# Patient Record
Sex: MALE | Race: WHITE | NOT HISPANIC OR LATINO | Employment: OTHER | URBAN - METROPOLITAN AREA
[De-identification: names, ages, dates, MRNs, and addresses within clinical notes are randomized per-mention and may not be internally consistent; named-entity substitution may affect disease eponyms.]

---

## 2017-12-13 PROBLEM — 59621000 ESSENTIAL HYPERTENSION: Status: ACTIVE | Noted: 2017-12-13

## 2017-12-13 PROBLEM — 313436004 TYPE II DIABETES MELLITUS WITHOUT COMPLICATION: Status: ACTIVE | Noted: 2017-12-13

## 2017-12-13 PROBLEM — 238131007 OVERWEIGHT: Status: ACTIVE | Noted: 2017-12-13

## 2020-06-29 ENCOUNTER — OFFICE VISIT (OUTPATIENT)
Dept: URBAN - METROPOLITAN AREA CLINIC 27 | Facility: CLINIC | Age: 65
End: 2020-06-29

## 2020-07-16 ENCOUNTER — OFFICE VISIT (OUTPATIENT)
Dept: URBAN - METROPOLITAN AREA SURGERY CENTER 7 | Facility: SURGERY CENTER | Age: 65
End: 2020-07-16

## 2021-06-01 ENCOUNTER — OFFICE VISIT (OUTPATIENT)
Dept: URBAN - METROPOLITAN AREA SURGERY CENTER 7 | Facility: SURGERY CENTER | Age: 66
End: 2021-06-01

## 2022-04-15 ENCOUNTER — OFFICE VISIT (OUTPATIENT)
Dept: URBAN - METROPOLITAN AREA CLINIC 27 | Facility: CLINIC | Age: 67
End: 2022-04-15

## 2022-04-19 ENCOUNTER — OFFICE VISIT (OUTPATIENT)
Dept: URBAN - METROPOLITAN AREA SURGERY CENTER 7 | Facility: SURGERY CENTER | Age: 67
End: 2022-04-19

## 2022-04-30 ENCOUNTER — TELEPHONE ENCOUNTER (OUTPATIENT)
Dept: URBAN - METROPOLITAN AREA CLINIC 121 | Facility: CLINIC | Age: 67
End: 2022-04-30

## 2022-04-30 RX ORDER — MUPIROCIN 20 MG/G
OINTMENT TOPICAL
OUTPATIENT
Start: 2017-12-13

## 2022-04-30 RX ORDER — CANAGLIFLOZIN 100 MG/1
TABLET, FILM COATED ORAL
OUTPATIENT
Start: 2017-12-13

## 2022-04-30 RX ORDER — ASPIRIN 325 MG/1
TABLET ORAL
OUTPATIENT
Start: 2017-12-21

## 2022-04-30 RX ORDER — FAMOTIDINE 10 MG
TABLET ORAL
OUTPATIENT
Start: 2017-12-13

## 2022-04-30 RX ORDER — LISDEXAMFETAMINE DIMESYLATE 70 MG/1
CAPSULE ORAL
OUTPATIENT
Start: 2017-12-13

## 2022-04-30 RX ORDER — LISINOPRIL 5 MG/1
TABLET ORAL
OUTPATIENT
Start: 2017-12-13

## 2022-04-30 RX ORDER — BUPROPION HYDROCHLORIDE 200 MG/1
TABLET, FILM COATED ORAL
OUTPATIENT
Start: 2017-12-13

## 2022-04-30 RX ORDER — EMPAGLIFLOZIN 10 MG/1
TABLET, FILM COATED ORAL
OUTPATIENT
Start: 2017-12-13 | End: 2022-04-15

## 2022-04-30 RX ORDER — LISDEXAMFETAMINE DIMESYLATE 70 MG/1
CAPSULE ORAL
OUTPATIENT
Start: 2017-12-13 | End: 2022-04-15

## 2022-04-30 RX ORDER — INSULIN DETEMIR 100 [IU]/ML
INJECTION, SOLUTION SUBCUTANEOUS
OUTPATIENT
Start: 2017-12-13 | End: 2022-04-15

## 2022-04-30 RX ORDER — ACYCLOVIR 200 MG/1
CAPSULE ORAL
OUTPATIENT
Start: 2017-12-13

## 2022-04-30 RX ORDER — TIZANIDINE HYDROCHLORIDE 4 MG/1
CAPSULE ORAL
OUTPATIENT
Start: 2017-12-13

## 2022-04-30 RX ORDER — OMEPRAZOLE 40 MG/1
1 CAPSULE PO QAM CAPSULE, DELAYED RELEASE ORAL
OUTPATIENT
Start: 2017-12-21

## 2022-04-30 RX ORDER — ARNICA MONTANA 30 [HP_C]/1
PELLET ORAL
OUTPATIENT
Start: 2017-12-13

## 2022-04-30 RX ORDER — HYDROCODONE BITARTRATE AND ACETAMINOPHEN 10; 325 MG/1; MG/1
TABLET ORAL
OUTPATIENT
Start: 2017-12-13

## 2022-04-30 RX ORDER — PRAVASTATIN SODIUM 40 MG/1
TABLET ORAL
OUTPATIENT
Start: 2017-12-13

## 2022-04-30 RX ORDER — GLIPIZIDE 10 MG/1
TABLET ORAL
OUTPATIENT
Start: 2017-12-13

## 2022-04-30 RX ORDER — RABEPRAZOLE SODIUM 20 MG/1
TABLET, DELAYED RELEASE ORAL
OUTPATIENT
Start: 2017-12-13

## 2022-04-30 RX ORDER — HYDROCODONE BITARTRATE AND ACETAMINOPHEN 10; 325 MG/1; MG/1
TABLET ORAL
OUTPATIENT
Start: 2017-12-13 | End: 2022-04-15

## 2022-04-30 RX ORDER — CANAGLIFLOZIN 100 MG/1
TABLET, FILM COATED ORAL
OUTPATIENT
Start: 2017-12-13 | End: 2022-04-15

## 2022-04-30 RX ORDER — LORATADINE ORAL 5 MG/5ML
SOLUTION ORAL
OUTPATIENT
Start: 2017-12-13

## 2022-04-30 RX ORDER — INSULIN DETEMIR 100 [IU]/ML
INJECTION, SOLUTION SUBCUTANEOUS
OUTPATIENT
Start: 2017-12-13

## 2022-04-30 RX ORDER — FAMOTIDINE 10 MG
TABLET ORAL
OUTPATIENT
Start: 2017-12-13 | End: 2022-04-15

## 2022-04-30 RX ORDER — ARMODAFINIL 250 MG/1
TABLET ORAL
OUTPATIENT
Start: 2017-12-13

## 2022-04-30 RX ORDER — EMPAGLIFLOZIN 10 MG/1
TABLET, FILM COATED ORAL
OUTPATIENT
Start: 2017-12-13

## 2022-04-30 RX ORDER — RABEPRAZOLE SODIUM 20 MG/1
TABLET, DELAYED RELEASE ORAL
OUTPATIENT
Start: 2017-12-13 | End: 2022-04-15

## 2022-04-30 RX ORDER — OMEPRAZOLE 40 MG/1
TAKE ONE CAPSULE BY MOUTH EVERY MORNING CAPSULE, DELAYED RELEASE ORAL
OUTPATIENT
Start: 2019-05-28

## 2022-04-30 RX ORDER — OMEPRAZOLE 40 MG/1
TAKE 1 CAPSULE BY MOUTH EVERY MORNING CAPSULE, DELAYED RELEASE ORAL
OUTPATIENT
Start: 2019-05-28 | End: 2022-04-15

## 2022-04-30 RX ORDER — MUPIROCIN 20 MG/G
OINTMENT TOPICAL
OUTPATIENT
Start: 2017-12-13 | End: 2022-04-15

## 2022-04-30 RX ORDER — METFORMIN HYDROCHLORIDE 850 MG/1
TABLET ORAL
OUTPATIENT
Start: 2017-12-13

## 2022-05-01 ENCOUNTER — TELEPHONE ENCOUNTER (OUTPATIENT)
Dept: URBAN - METROPOLITAN AREA CLINIC 121 | Facility: CLINIC | Age: 67
End: 2022-05-01

## 2022-05-01 RX ORDER — GLIPIZIDE 10 MG/1
TABLET ORAL
Status: ACTIVE | COMMUNITY
Start: 2017-12-13

## 2022-05-01 RX ORDER — PRAVASTATIN SODIUM 40 MG/1
TABLET ORAL
Status: ACTIVE | COMMUNITY
Start: 2017-12-13

## 2022-05-01 RX ORDER — TIZANIDINE HYDROCHLORIDE 4 MG/1
CAPSULE ORAL
Status: ACTIVE | COMMUNITY
Start: 2017-12-13

## 2022-05-01 RX ORDER — ARMODAFINIL 250 MG/1
TABLET ORAL
Status: ACTIVE | COMMUNITY
Start: 2017-12-13

## 2022-05-01 RX ORDER — METFORMIN HYDROCHLORIDE 850 MG/1
TABLET ORAL
Status: ACTIVE | COMMUNITY
Start: 2017-12-13

## 2022-05-01 RX ORDER — LORATADINE 5 MG/5ML
SOLUTION ORAL
Status: ACTIVE | COMMUNITY
Start: 2017-12-13

## 2022-05-01 RX ORDER — LISINOPRIL 5 MG/1
TABLET ORAL
Status: ACTIVE | COMMUNITY
Start: 2017-12-13

## 2022-05-01 RX ORDER — ASPIRIN 325 MG/1
TABLET ORAL
Status: ACTIVE | COMMUNITY
Start: 2017-12-21

## 2022-05-01 RX ORDER — BUPROPION HYDROCHLORIDE 200 MG/1
TABLET, FILM COATED ORAL
Status: ACTIVE | COMMUNITY
Start: 2017-12-13

## 2022-05-01 RX ORDER — DICLOFENAC SODIUM 1 %
GEL (GRAM) TOPICAL
Status: ACTIVE | COMMUNITY
Start: 2017-12-13

## 2022-05-01 RX ORDER — PANTOPRAZOLE SODIUM 40 MG/1
TAKE 1 TABLET BY MOUTH ONCE A DAY TABLET, DELAYED RELEASE ORAL
Status: ACTIVE | COMMUNITY
Start: 2022-04-25 | End: 2022-08-23

## 2022-05-06 ENCOUNTER — OFFICE VISIT (OUTPATIENT)
Dept: URBAN - METROPOLITAN AREA SURGERY CENTER 7 | Facility: SURGERY CENTER | Age: 67
End: 2022-05-06

## 2022-06-01 ENCOUNTER — OFFICE VISIT (OUTPATIENT)
Dept: URBAN - METROPOLITAN AREA SURGERY CENTER 7 | Facility: SURGERY CENTER | Age: 67
End: 2022-06-01
Payer: MEDICARE

## 2022-06-01 ENCOUNTER — WEB ENCOUNTER (OUTPATIENT)
Dept: URBAN - METROPOLITAN AREA SURGERY CENTER 7 | Facility: SURGERY CENTER | Age: 67
End: 2022-06-01

## 2022-06-01 DIAGNOSIS — R13.14 CRICOPHARYNGEAL DISORDER: ICD-10-CM

## 2022-06-01 DIAGNOSIS — K22.2 ACQUIRED ESOPHAGEAL RING: ICD-10-CM

## 2022-06-01 PROCEDURE — 43235 EGD DIAGNOSTIC BRUSH WASH: CPT | Performed by: CLINIC/CENTER

## 2022-06-01 PROCEDURE — 43235 EGD DIAGNOSTIC BRUSH WASH: CPT | Performed by: INTERNAL MEDICINE

## 2022-06-01 PROCEDURE — 43450 DILATE ESOPHAGUS 1/MULT PASS: CPT | Performed by: INTERNAL MEDICINE

## 2022-06-01 PROCEDURE — G8075 ESRD PT W/ DIALY OF URR>=65%: HCPCS | Performed by: CLINIC/CENTER

## 2022-06-01 PROCEDURE — G8907 PT DOC NO EVENTS ON DISCHARG: HCPCS | Performed by: INTERNAL MEDICINE

## 2022-06-01 PROCEDURE — 43450 DILATE ESOPHAGUS 1/MULT PASS: CPT | Performed by: CLINIC/CENTER

## 2022-06-01 PROCEDURE — G8075 ESRD PT W/ DIALY OF URR>=65%: HCPCS | Performed by: INTERNAL MEDICINE

## 2022-06-01 PROCEDURE — G8907 PT DOC NO EVENTS ON DISCHARG: HCPCS | Performed by: CLINIC/CENTER

## 2022-06-01 RX ORDER — BUPROPION HYDROCHLORIDE 200 MG/1
TABLET, FILM COATED ORAL
Status: ACTIVE | COMMUNITY
Start: 2017-12-13

## 2022-06-01 RX ORDER — PRAVASTATIN SODIUM 40 MG/1
TABLET ORAL
Status: ACTIVE | COMMUNITY
Start: 2017-12-13

## 2022-06-01 RX ORDER — GLIPIZIDE 10 MG/1
TABLET ORAL
Status: ACTIVE | COMMUNITY
Start: 2017-12-13

## 2022-06-01 RX ORDER — METFORMIN HYDROCHLORIDE 850 MG/1
TABLET ORAL
Status: ACTIVE | COMMUNITY
Start: 2017-12-13

## 2022-06-01 RX ORDER — DICLOFENAC SODIUM 1 %
GEL (GRAM) TOPICAL
Status: ACTIVE | COMMUNITY
Start: 2017-12-13

## 2022-06-01 RX ORDER — LORATADINE 5 MG/5ML
SOLUTION ORAL
Status: ACTIVE | COMMUNITY
Start: 2017-12-13

## 2022-06-01 RX ORDER — ASPIRIN 325 MG/1
TABLET ORAL
Status: ACTIVE | COMMUNITY
Start: 2017-12-21

## 2022-06-01 RX ORDER — ARMODAFINIL 250 MG/1
TABLET ORAL
Status: ACTIVE | COMMUNITY
Start: 2017-12-13

## 2022-06-01 RX ORDER — PANTOPRAZOLE SODIUM 40 MG/1
TAKE 1 TABLET BY MOUTH ONCE A DAY TABLET, DELAYED RELEASE ORAL
Status: ACTIVE | COMMUNITY
Start: 2022-04-25 | End: 2022-08-23

## 2022-06-01 RX ORDER — TIZANIDINE HYDROCHLORIDE 4 MG/1
CAPSULE ORAL
Status: ACTIVE | COMMUNITY
Start: 2017-12-13

## 2022-06-01 RX ORDER — LISINOPRIL 5 MG/1
TABLET ORAL
Status: ACTIVE | COMMUNITY
Start: 2017-12-13

## 2022-08-23 ENCOUNTER — ERX REFILL RESPONSE (OUTPATIENT)
Dept: URBAN - METROPOLITAN AREA CLINIC 27 | Facility: CLINIC | Age: 67
End: 2022-08-23

## 2022-08-23 RX ORDER — PANTOPRAZOLE SODIUM 40 MG/1
TAKE ONE TABLET BY MOUTH DAILY TABLET, DELAYED RELEASE ORAL
Qty: 30 TABLET | Refills: 0 | OUTPATIENT

## 2022-08-23 RX ORDER — PANTOPRAZOLE SODIUM 40 MG/1
TAKE 1 TABLET BY MOUTH ONCE A DAY TABLET, DELAYED RELEASE ORAL
OUTPATIENT

## 2022-09-26 ENCOUNTER — ERX REFILL RESPONSE (OUTPATIENT)
Dept: URBAN - METROPOLITAN AREA CLINIC 27 | Facility: CLINIC | Age: 67
End: 2022-09-26

## 2022-09-26 RX ORDER — PANTOPRAZOLE SODIUM 40 MG/1
TAKE ONE TABLET BY MOUTH DAILY TABLET, DELAYED RELEASE ORAL
Qty: 30 TABLET | Refills: 0 | OUTPATIENT

## 2022-10-14 ENCOUNTER — TELEPHONE ENCOUNTER (OUTPATIENT)
Dept: URBAN - METROPOLITAN AREA CLINIC 29 | Facility: CLINIC | Age: 67
End: 2022-10-14

## 2022-10-14 RX ORDER — PANTOPRAZOLE SODIUM 40 MG/1
TAKE ONE TABLET BY MOUTH DAILY TABLET, DELAYED RELEASE ORAL ONCE A DAY
Qty: 30 | Refills: 3

## 2022-10-16 ENCOUNTER — ERX REFILL RESPONSE (OUTPATIENT)
Dept: URBAN - METROPOLITAN AREA CLINIC 27 | Facility: CLINIC | Age: 67
End: 2022-10-16

## 2022-10-16 RX ORDER — PANTOPRAZOLE SODIUM 40 MG/1
TAKE ONE TABLET BY MOUTH DAILY TABLET, DELAYED RELEASE ORAL
Qty: 30 TABLET | Refills: 0 | OUTPATIENT

## 2022-10-16 RX ORDER — PANTOPRAZOLE SODIUM 40 MG/1
TAKE ONE TABLET BY MOUTH DAILY TABLET, DELAYED RELEASE ORAL ONCE A DAY
Qty: 30 | Refills: 3 | OUTPATIENT

## 2023-03-10 PROBLEM — 40739000 DYSPHAGIA: Status: ACTIVE | Noted: 2017-12-13

## 2023-03-13 ENCOUNTER — ERX REFILL RESPONSE (OUTPATIENT)
Dept: URBAN - METROPOLITAN AREA CLINIC 29 | Facility: CLINIC | Age: 68
End: 2023-03-13

## 2023-03-13 RX ORDER — PANTOPRAZOLE 40 MG/1
TAKE ONE TABLET BY MOUTH DAILY TABLET, DELAYED RELEASE ORAL
Qty: 30 TABLET | Refills: 0 | OUTPATIENT

## 2023-03-13 RX ORDER — PANTOPRAZOLE SODIUM 40 MG/1
TAKE ONE TABLET BY MOUTH DAILY TABLET, DELAYED RELEASE ORAL
Qty: 30 TABLET | Refills: 0 | OUTPATIENT

## 2023-04-03 ENCOUNTER — LAB OUTSIDE AN ENCOUNTER (OUTPATIENT)
Dept: URBAN - METROPOLITAN AREA CLINIC 27 | Facility: CLINIC | Age: 68
End: 2023-04-03

## 2023-04-03 ENCOUNTER — OFFICE VISIT (OUTPATIENT)
Dept: URBAN - METROPOLITAN AREA CLINIC 27 | Facility: CLINIC | Age: 68
End: 2023-04-03
Payer: MEDICARE

## 2023-04-03 ENCOUNTER — DASHBOARD ENCOUNTERS (OUTPATIENT)
Age: 68
End: 2023-04-03

## 2023-04-03 VITALS
DIASTOLIC BLOOD PRESSURE: 54 MMHG | WEIGHT: 160 LBS | BODY MASS INDEX: 26.66 KG/M2 | HEIGHT: 65 IN | SYSTOLIC BLOOD PRESSURE: 116 MMHG | HEART RATE: 69 BPM

## 2023-04-03 DIAGNOSIS — K21.9 GERD WITHOUT ESOPHAGITIS: ICD-10-CM

## 2023-04-03 DIAGNOSIS — Z12.11 ENCOUNTER FOR SCREENING FOR MALIGNANT NEOPLASM OF COLON: ICD-10-CM

## 2023-04-03 DIAGNOSIS — R13.19 ESOPHAGEAL DYSPHAGIA: ICD-10-CM

## 2023-04-03 PROBLEM — 275978004 SCREENING FOR MALIGNANT NEOPLASM OF COLON: Status: ACTIVE | Noted: 2017-12-13

## 2023-04-03 PROBLEM — 266435005: Status: ACTIVE | Noted: 2023-04-03

## 2023-04-03 PROCEDURE — 99214 OFFICE O/P EST MOD 30 MIN: CPT | Performed by: INTERNAL MEDICINE

## 2023-04-03 RX ORDER — ARMODAFINIL 250 MG/1
TABLET ORAL
Status: ACTIVE | COMMUNITY
Start: 2017-12-13

## 2023-04-03 RX ORDER — TIZANIDINE HYDROCHLORIDE 4 MG/1
CAPSULE ORAL
Status: ACTIVE | COMMUNITY
Start: 2017-12-13

## 2023-04-03 RX ORDER — PRAVASTATIN SODIUM 40 MG/1
TABLET ORAL
Status: ACTIVE | COMMUNITY
Start: 2017-12-13

## 2023-04-03 RX ORDER — DICLOFENAC SODIUM 1 %
GEL (GRAM) TOPICAL
Status: ACTIVE | COMMUNITY
Start: 2017-12-13

## 2023-04-03 RX ORDER — PANTOPRAZOLE SODIUM 40 MG/1
1 TABLET TABLET, DELAYED RELEASE ORAL ONCE A DAY
Qty: 90 TABLET | Refills: 3 | OUTPATIENT
Start: 2023-04-03

## 2023-04-03 RX ORDER — ASPIRIN 325 MG/1
TABLET ORAL
Status: ACTIVE | COMMUNITY
Start: 2017-12-21

## 2023-04-03 RX ORDER — HYDROCODONE BITARTRATE AND ACETAMINOPHEN 10; 325 MG/1; MG/1
1 TABLET AS NEEDED TABLET ORAL
Status: ACTIVE | COMMUNITY

## 2023-04-03 RX ORDER — BUPROPION HYDROCHLORIDE 200 MG/1
TABLET, FILM COATED ORAL
Status: ACTIVE | COMMUNITY
Start: 2017-12-13

## 2023-04-03 RX ORDER — PANTOPRAZOLE 40 MG/1
TAKE ONE TABLET BY MOUTH DAILY TABLET, DELAYED RELEASE ORAL
Qty: 30 TABLET | Refills: 0 | Status: ACTIVE | COMMUNITY

## 2023-04-03 RX ORDER — LORATADINE 5 MG/5ML
SOLUTION ORAL
Status: ACTIVE | COMMUNITY
Start: 2017-12-13

## 2023-04-03 RX ORDER — METFORMIN HYDROCHLORIDE 850 MG/1
TABLET ORAL
Status: ACTIVE | COMMUNITY
Start: 2017-12-13

## 2023-04-03 RX ORDER — LISINOPRIL 5 MG/1
TABLET ORAL
Status: ACTIVE | COMMUNITY
Start: 2017-12-13

## 2023-04-03 RX ORDER — GLIPIZIDE 10 MG/1
TABLET ORAL
Status: ACTIVE | COMMUNITY
Start: 2017-12-13

## 2023-04-03 NOTE — HPI-TODAY'S VISIT:
68-year-old male here for recurrent dysphagia.  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  He does have heartburn/reflux, symptoms have been better controlled since switching omeprazole to pantoprazole.  He also takes famotidine once a day and as needed for breakthrough symptoms.  Denies weight loss or melena.  Last EGD and dilation was about a year ago.  Last colonoscopy was 2012, he thinks it was normal, records not currently available.

## 2023-04-18 ENCOUNTER — OFFICE VISIT (OUTPATIENT)
Dept: URBAN - METROPOLITAN AREA SURGERY CENTER 7 | Facility: SURGERY CENTER | Age: 68
End: 2023-04-18

## 2023-04-25 ENCOUNTER — CLAIMS CREATED FROM THE CLAIM WINDOW (OUTPATIENT)
Dept: URBAN - METROPOLITAN AREA CLINIC 4 | Facility: CLINIC | Age: 68
End: 2023-04-25
Payer: MEDICARE

## 2023-04-25 ENCOUNTER — CLAIMS CREATED FROM THE CLAIM WINDOW (OUTPATIENT)
Dept: URBAN - METROPOLITAN AREA SURGERY CENTER 7 | Facility: SURGERY CENTER | Age: 68
End: 2023-04-25
Payer: MEDICARE

## 2023-04-25 DIAGNOSIS — R13.19 CERVICAL DYSPHAGIA: ICD-10-CM

## 2023-04-25 DIAGNOSIS — K31.89 ACQUIRED DEFORMITY OF DUODENUM: ICD-10-CM

## 2023-04-25 DIAGNOSIS — K29.70 GASTRITIS, UNSPECIFIED, WITHOUT BLEEDING: ICD-10-CM

## 2023-04-25 DIAGNOSIS — K22.2 ACQUIRED ESOPHAGEAL RING: ICD-10-CM

## 2023-04-25 DIAGNOSIS — Z12.11 AVERAGE RISK FOR CRC. DUE TO PT'S CO-MORBID STATE WITH END STAGE DEMENTIA, HIGH RISK FOR ANESTHESIA (PER NEUROLOGY); INABILITY TO TAKE A BOWEL PREP....WOULD NOT ADVISE ANY COLORECTAL CANCER SCREENING INCLUDING STOOL TEST FOR FECAL BLOOD.: ICD-10-CM

## 2023-04-25 PROCEDURE — 88305 TISSUE EXAM BY PATHOLOGIST: CPT | Performed by: PATHOLOGY

## 2023-04-25 PROCEDURE — G8907 PT DOC NO EVENTS ON DISCHARG: HCPCS | Performed by: CLINIC/CENTER

## 2023-04-25 PROCEDURE — 43239 EGD BIOPSY SINGLE/MULTIPLE: CPT | Performed by: CLINIC/CENTER

## 2023-04-25 PROCEDURE — G8907 PT DOC NO EVENTS ON DISCHARG: HCPCS | Performed by: INTERNAL MEDICINE

## 2023-04-25 PROCEDURE — 88312 SPECIAL STAINS GROUP 1: CPT | Performed by: PATHOLOGY

## 2023-04-25 PROCEDURE — 43239 EGD BIOPSY SINGLE/MULTIPLE: CPT | Performed by: INTERNAL MEDICINE

## 2023-04-25 PROCEDURE — 43450 DILATE ESOPHAGUS 1/MULT PASS: CPT | Performed by: CLINIC/CENTER

## 2023-04-25 PROCEDURE — 43450 DILATE ESOPHAGUS 1/MULT PASS: CPT | Performed by: INTERNAL MEDICINE

## 2023-04-25 PROCEDURE — G0121 COLON CA SCRN NOT HI RSK IND: HCPCS | Performed by: CLINIC/CENTER

## 2023-04-25 PROCEDURE — G0121 COLON CA SCRN NOT HI RSK IND: HCPCS | Performed by: INTERNAL MEDICINE

## 2023-04-25 RX ORDER — METFORMIN HYDROCHLORIDE 850 MG/1
TABLET ORAL
Status: ACTIVE | COMMUNITY
Start: 2017-12-13

## 2023-04-25 RX ORDER — PRAVASTATIN SODIUM 40 MG/1
TABLET ORAL
Status: ACTIVE | COMMUNITY
Start: 2017-12-13

## 2023-04-25 RX ORDER — PANTOPRAZOLE 40 MG/1
TAKE ONE TABLET BY MOUTH DAILY TABLET, DELAYED RELEASE ORAL
Qty: 30 TABLET | Refills: 0 | Status: ACTIVE | COMMUNITY

## 2023-04-25 RX ORDER — GLIPIZIDE 10 MG/1
TABLET ORAL
Status: ACTIVE | COMMUNITY
Start: 2017-12-13

## 2023-04-25 RX ORDER — PANTOPRAZOLE SODIUM 40 MG/1
1 TABLET TABLET, DELAYED RELEASE ORAL ONCE A DAY
Qty: 90 TABLET | Refills: 3 | Status: ACTIVE | COMMUNITY
Start: 2023-04-03

## 2023-04-25 RX ORDER — DICLOFENAC SODIUM 1 %
GEL (GRAM) TOPICAL
Status: ACTIVE | COMMUNITY
Start: 2017-12-13

## 2023-04-25 RX ORDER — BUPROPION HYDROCHLORIDE 200 MG/1
TABLET, FILM COATED ORAL
Status: ACTIVE | COMMUNITY
Start: 2017-12-13

## 2023-04-25 RX ORDER — LISINOPRIL 5 MG/1
TABLET ORAL
Status: ACTIVE | COMMUNITY
Start: 2017-12-13

## 2023-04-25 RX ORDER — TIZANIDINE HYDROCHLORIDE 4 MG/1
CAPSULE ORAL
Status: ACTIVE | COMMUNITY
Start: 2017-12-13

## 2023-04-25 RX ORDER — ASPIRIN 325 MG/1
TABLET ORAL
Status: ACTIVE | COMMUNITY
Start: 2017-12-21

## 2023-04-25 RX ORDER — LORATADINE 5 MG/5ML
SOLUTION ORAL
Status: ACTIVE | COMMUNITY
Start: 2017-12-13

## 2023-04-25 RX ORDER — HYDROCODONE BITARTRATE AND ACETAMINOPHEN 10; 325 MG/1; MG/1
1 TABLET AS NEEDED TABLET ORAL
Status: ACTIVE | COMMUNITY

## 2023-04-25 RX ORDER — ARMODAFINIL 250 MG/1
TABLET ORAL
Status: ACTIVE | COMMUNITY
Start: 2017-12-13

## 2023-04-27 ENCOUNTER — OFFICE VISIT (OUTPATIENT)
Dept: URBAN - METROPOLITAN AREA SURGERY CENTER 7 | Facility: SURGERY CENTER | Age: 68
End: 2023-04-27

## 2023-05-09 ENCOUNTER — ERX REFILL RESPONSE (OUTPATIENT)
Dept: URBAN - METROPOLITAN AREA CLINIC 29 | Facility: CLINIC | Age: 68
End: 2023-05-09

## 2023-05-09 RX ORDER — PANTOPRAZOLE SODIUM 40 MG/1
1 TABLET TABLET, DELAYED RELEASE ORAL ONCE A DAY
Qty: 90 TABLET | Refills: 3 | OUTPATIENT

## 2023-05-09 RX ORDER — PANTOPRAZOLE 40 MG/1
TAKE ONE TABLET BY MOUTH DAILY TABLET, DELAYED RELEASE ORAL
Qty: 30 TABLET | Refills: 0 | OUTPATIENT

## 2023-06-06 ENCOUNTER — ERX REFILL RESPONSE (OUTPATIENT)
Dept: URBAN - METROPOLITAN AREA CLINIC 29 | Facility: CLINIC | Age: 68
End: 2023-06-06

## 2023-06-06 RX ORDER — PANTOPRAZOLE 40 MG/1
TAKE ONE TABLET BY MOUTH DAILY TABLET, DELAYED RELEASE ORAL
Qty: 30 TABLET | Refills: 0 | OUTPATIENT

## 2023-07-06 ENCOUNTER — ERX REFILL RESPONSE (OUTPATIENT)
Dept: URBAN - METROPOLITAN AREA CLINIC 29 | Facility: CLINIC | Age: 68
End: 2023-07-06

## 2023-07-06 RX ORDER — PANTOPRAZOLE 40 MG/1
TAKE 1 TABLET BY MOUTH DAILY TABLET, DELAYED RELEASE ORAL
Qty: 30 TABLET | Refills: 0 | OUTPATIENT

## 2023-07-06 RX ORDER — PANTOPRAZOLE 40 MG/1
TAKE ONE TABLET BY MOUTH DAILY TABLET, DELAYED RELEASE ORAL
Qty: 30 TABLET | Refills: 0 | OUTPATIENT

## 2023-08-11 ENCOUNTER — ERX REFILL RESPONSE (OUTPATIENT)
Dept: URBAN - METROPOLITAN AREA CLINIC 29 | Facility: CLINIC | Age: 68
End: 2023-08-11

## 2023-08-11 RX ORDER — PANTOPRAZOLE 40 MG/1
TAKE 1 TABLET BY MOUTH DAILY TABLET, DELAYED RELEASE ORAL
Qty: 30 TABLET | Refills: 0 | OUTPATIENT

## 2023-08-11 RX ORDER — PANTOPRAZOLE 40 MG/1
TAKE 1 TABLET BY MOUTH DAILY TABLET, DELAYED RELEASE ORAL
Qty: 30 TABLET | Refills: 5 | OUTPATIENT

## 2023-08-15 ENCOUNTER — ERX REFILL RESPONSE (OUTPATIENT)
Dept: URBAN - METROPOLITAN AREA CLINIC 29 | Facility: CLINIC | Age: 68
End: 2023-08-15

## 2023-08-15 RX ORDER — PANTOPRAZOLE 40 MG/1
TAKE 1 TABLET BY MOUTH DAILY TABLET, DELAYED RELEASE ORAL
Qty: 30 TABLET | Refills: 5 | OUTPATIENT

## 2024-08-08 ENCOUNTER — TELEPHONE ENCOUNTER (OUTPATIENT)
Dept: URBAN - METROPOLITAN AREA CLINIC 27 | Facility: CLINIC | Age: 69
End: 2024-08-08

## 2024-08-14 ENCOUNTER — OFFICE VISIT (OUTPATIENT)
Dept: URBAN - METROPOLITAN AREA CLINIC 27 | Facility: CLINIC | Age: 69
End: 2024-08-14
Payer: MEDICARE

## 2024-08-14 ENCOUNTER — LAB OUTSIDE AN ENCOUNTER (OUTPATIENT)
Dept: URBAN - METROPOLITAN AREA CLINIC 27 | Facility: CLINIC | Age: 69
End: 2024-08-14

## 2024-08-14 VITALS
SYSTOLIC BLOOD PRESSURE: 117 MMHG | WEIGHT: 155 LBS | DIASTOLIC BLOOD PRESSURE: 68 MMHG | HEART RATE: 79 BPM | HEIGHT: 65 IN | BODY MASS INDEX: 25.83 KG/M2

## 2024-08-14 DIAGNOSIS — K21.9 CHRONIC GERD: ICD-10-CM

## 2024-08-14 DIAGNOSIS — R13.19 ESOPHAGEAL DYSPHAGIA: ICD-10-CM

## 2024-08-14 PROBLEM — 235595009: Status: ACTIVE | Noted: 2024-08-14

## 2024-08-14 PROCEDURE — 99213 OFFICE O/P EST LOW 20 MIN: CPT | Performed by: INTERNAL MEDICINE

## 2024-08-14 RX ORDER — LISINOPRIL 5 MG/1
TABLET ORAL
Status: ACTIVE | COMMUNITY
Start: 2017-12-13

## 2024-08-14 RX ORDER — HYDROCODONE BITARTRATE AND ACETAMINOPHEN 10; 325 MG/1; MG/1
1 TABLET AS NEEDED TABLET ORAL
Status: ACTIVE | COMMUNITY

## 2024-08-14 RX ORDER — ASPIRIN 325 MG/1
TABLET ORAL
Status: ACTIVE | COMMUNITY
Start: 2017-12-21

## 2024-08-14 RX ORDER — PRAVASTATIN SODIUM 40 MG/1
TABLET ORAL
Status: ACTIVE | COMMUNITY
Start: 2017-12-13

## 2024-08-14 RX ORDER — DICLOFENAC SODIUM 1 %
GEL (GRAM) TOPICAL
Status: ACTIVE | COMMUNITY
Start: 2017-12-13

## 2024-08-14 RX ORDER — METFORMIN HYDROCHLORIDE 850 MG/1
TABLET ORAL
Status: ACTIVE | COMMUNITY
Start: 2017-12-13

## 2024-08-14 RX ORDER — LORATADINE 5 MG/5ML
SOLUTION ORAL
Status: ACTIVE | COMMUNITY
Start: 2017-12-13

## 2024-08-14 RX ORDER — PANTOPRAZOLE 40 MG/1
TAKE 1 TABLET BY MOUTH DAILY TABLET, DELAYED RELEASE ORAL
Qty: 30 TABLET | Refills: 5 | Status: ACTIVE | COMMUNITY

## 2024-08-14 RX ORDER — TIZANIDINE HYDROCHLORIDE 4 MG/1
CAPSULE ORAL
Status: ACTIVE | COMMUNITY
Start: 2017-12-13

## 2024-08-14 RX ORDER — GLIPIZIDE 10 MG/1
TABLET ORAL
Status: ACTIVE | COMMUNITY
Start: 2017-12-13

## 2024-08-14 RX ORDER — BUPROPION HYDROCHLORIDE 200 MG/1
TABLET, FILM COATED ORAL
Status: ACTIVE | COMMUNITY
Start: 2017-12-13

## 2024-08-14 RX ORDER — ARMODAFINIL 250 MG/1
TABLET ORAL
Status: ACTIVE | COMMUNITY
Start: 2017-12-13

## 2024-08-14 NOTE — HPI-TODAY'S VISIT:
69-year-old male here for dysphagia.  He has a history of a distal esophageal ring requiring intermittent dilations.  Last EGD and dilation was April 2023.  He is now having solid food dysphagia and problems swallowing pills.  Denies weight loss.  He is on pantoprazole which controls his reflux symptoms well.  Last colonoscopy was last year which was unremarkable.

## 2024-08-20 ENCOUNTER — TELEPHONE ENCOUNTER (OUTPATIENT)
Dept: URBAN - METROPOLITAN AREA CLINIC 27 | Facility: CLINIC | Age: 69
End: 2024-08-20

## 2024-08-27 ENCOUNTER — OFFICE VISIT (OUTPATIENT)
Dept: URBAN - METROPOLITAN AREA SURGERY CENTER 7 | Facility: SURGERY CENTER | Age: 69
End: 2024-08-27

## 2024-09-03 ENCOUNTER — CLAIMS CREATED FROM THE CLAIM WINDOW (OUTPATIENT)
Dept: URBAN - METROPOLITAN AREA SURGERY CENTER 7 | Facility: SURGERY CENTER | Age: 69
End: 2024-09-03
Payer: MEDICARE

## 2024-09-03 ENCOUNTER — CLAIMS CREATED FROM THE CLAIM WINDOW (OUTPATIENT)
Dept: URBAN - METROPOLITAN AREA CLINIC 4 | Facility: CLINIC | Age: 69
End: 2024-09-03
Payer: MEDICARE

## 2024-09-03 DIAGNOSIS — R13.19 CERVICAL DYSPHAGIA: ICD-10-CM

## 2024-09-03 DIAGNOSIS — K44.9 HIATAL HERNIA: ICD-10-CM

## 2024-09-03 DIAGNOSIS — K29.70 GASTRITIS, UNSPECIFIED, WITHOUT BLEEDING: ICD-10-CM

## 2024-09-03 DIAGNOSIS — K21.9 GASTRO-ESOPHAGEAL REFLUX DISEASE WITHOUT ESOPHAGITIS: ICD-10-CM

## 2024-09-03 DIAGNOSIS — K31.89 OTHER DISEASES OF STOMACH AND DUODENUM: ICD-10-CM

## 2024-09-03 DIAGNOSIS — K29.70 GASTRITIS: ICD-10-CM

## 2024-09-03 DIAGNOSIS — R13.10 DYSPHAGIA, UNSPECIFIED: ICD-10-CM

## 2024-09-03 DIAGNOSIS — K22.2 BENIGN ESOPHAGEAL STRICTURE: ICD-10-CM

## 2024-09-03 DIAGNOSIS — R13.10 DYSPHAGIA: ICD-10-CM

## 2024-09-03 DIAGNOSIS — K21.9 GERD: ICD-10-CM

## 2024-09-03 PROCEDURE — 88342 IMHCHEM/IMCYTCHM 1ST ANTB: CPT | Performed by: PATHOLOGY

## 2024-09-03 PROCEDURE — 88305 TISSUE EXAM BY PATHOLOGIST: CPT | Performed by: PATHOLOGY

## 2024-09-03 PROCEDURE — 88312 SPECIAL STAINS GROUP 1: CPT | Performed by: PATHOLOGY

## 2024-09-03 PROCEDURE — 43450 DILATE ESOPHAGUS 1/MULT PASS: CPT | Performed by: CLINIC/CENTER

## 2024-09-03 PROCEDURE — 00731 ANES UPR GI NDSC PX NOS: CPT | Performed by: NURSE ANESTHETIST, CERTIFIED REGISTERED

## 2024-09-03 PROCEDURE — 43239 EGD BIOPSY SINGLE/MULTIPLE: CPT | Performed by: CLINIC/CENTER

## 2024-09-03 PROCEDURE — 43239 EGD BIOPSY SINGLE/MULTIPLE: CPT | Performed by: INTERNAL MEDICINE

## 2024-09-03 PROCEDURE — 43450 DILATE ESOPHAGUS 1/MULT PASS: CPT | Performed by: INTERNAL MEDICINE

## 2024-09-03 RX ORDER — HYDROCODONE BITARTRATE AND ACETAMINOPHEN 10; 325 MG/1; MG/1
1 TABLET AS NEEDED TABLET ORAL
Status: ACTIVE | COMMUNITY

## 2024-09-03 RX ORDER — METFORMIN HYDROCHLORIDE 850 MG/1
TABLET ORAL
Status: ACTIVE | COMMUNITY
Start: 2017-12-13

## 2024-09-03 RX ORDER — ARMODAFINIL 250 MG/1
TABLET ORAL
Status: ACTIVE | COMMUNITY
Start: 2017-12-13

## 2024-09-03 RX ORDER — ASPIRIN 325 MG/1
TABLET ORAL
Status: ACTIVE | COMMUNITY
Start: 2017-12-21

## 2024-09-03 RX ORDER — TIZANIDINE HYDROCHLORIDE 4 MG/1
CAPSULE ORAL
Status: ACTIVE | COMMUNITY
Start: 2017-12-13

## 2024-09-03 RX ORDER — PRAVASTATIN SODIUM 40 MG/1
TABLET ORAL
Status: ACTIVE | COMMUNITY
Start: 2017-12-13

## 2024-09-03 RX ORDER — LORATADINE 5 MG/5ML
SOLUTION ORAL
Status: ACTIVE | COMMUNITY
Start: 2017-12-13

## 2024-09-03 RX ORDER — PANTOPRAZOLE 40 MG/1
TAKE 1 TABLET BY MOUTH DAILY TABLET, DELAYED RELEASE ORAL
Qty: 30 TABLET | Refills: 5 | Status: ACTIVE | COMMUNITY

## 2024-09-03 RX ORDER — DICLOFENAC SODIUM 1 %
GEL (GRAM) TOPICAL
Status: ACTIVE | COMMUNITY
Start: 2017-12-13

## 2024-09-03 RX ORDER — BUPROPION HYDROCHLORIDE 200 MG/1
TABLET, FILM COATED ORAL
Status: ACTIVE | COMMUNITY
Start: 2017-12-13

## 2024-09-03 RX ORDER — LISINOPRIL 5 MG/1
TABLET ORAL
Status: ACTIVE | COMMUNITY
Start: 2017-12-13

## 2024-09-03 RX ORDER — GLIPIZIDE 10 MG/1
TABLET ORAL
Status: ACTIVE | COMMUNITY
Start: 2017-12-13

## 2025-04-15 ENCOUNTER — LAB OUTSIDE AN ENCOUNTER (OUTPATIENT)
Dept: URBAN - METROPOLITAN AREA CLINIC 27 | Facility: CLINIC | Age: 70
End: 2025-04-15

## 2025-04-15 ENCOUNTER — OFFICE VISIT (OUTPATIENT)
Dept: URBAN - METROPOLITAN AREA CLINIC 27 | Facility: CLINIC | Age: 70
End: 2025-04-15
Payer: MEDICARE

## 2025-04-15 DIAGNOSIS — R13.19 ESOPHAGEAL DYSPHAGIA: ICD-10-CM

## 2025-04-15 PROCEDURE — 99213 OFFICE O/P EST LOW 20 MIN: CPT | Performed by: INTERNAL MEDICINE

## 2025-04-15 RX ORDER — ARMODAFINIL 250 MG/1
TABLET ORAL
Status: ACTIVE | COMMUNITY
Start: 2017-12-13

## 2025-04-15 RX ORDER — PRAVASTATIN SODIUM 40 MG/1
TABLET ORAL
Status: ACTIVE | COMMUNITY
Start: 2017-12-13

## 2025-04-15 RX ORDER — LORATADINE 5 MG/5ML
SOLUTION ORAL
Status: ACTIVE | COMMUNITY
Start: 2017-12-13

## 2025-04-15 RX ORDER — GLIPIZIDE 10 MG/1
TABLET ORAL
Status: ACTIVE | COMMUNITY
Start: 2017-12-13

## 2025-04-15 RX ORDER — BUPROPION HYDROCHLORIDE 200 MG/1
TABLET, FILM COATED ORAL
Status: ACTIVE | COMMUNITY
Start: 2017-12-13

## 2025-04-15 RX ORDER — METFORMIN HYDROCHLORIDE 850 MG/1
TABLET ORAL
Status: ACTIVE | COMMUNITY
Start: 2017-12-13

## 2025-04-15 RX ORDER — DICLOFENAC SODIUM 1 %
GEL (GRAM) TOPICAL
Status: ACTIVE | COMMUNITY
Start: 2017-12-13

## 2025-04-15 RX ORDER — LISINOPRIL 5 MG/1
TABLET ORAL
Status: ACTIVE | COMMUNITY
Start: 2017-12-13

## 2025-04-15 RX ORDER — HYDROCODONE BITARTRATE AND ACETAMINOPHEN 10; 325 MG/1; MG/1
1 TABLET AS NEEDED TABLET ORAL
Status: ACTIVE | COMMUNITY

## 2025-04-15 RX ORDER — PANTOPRAZOLE 40 MG/1
TAKE 1 TABLET BY MOUTH DAILY TABLET, DELAYED RELEASE ORAL
Qty: 30 TABLET | Refills: 5 | Status: ACTIVE | COMMUNITY

## 2025-04-15 RX ORDER — TIZANIDINE HYDROCHLORIDE 4 MG/1
CAPSULE ORAL
Status: ACTIVE | COMMUNITY
Start: 2017-12-13

## 2025-04-15 RX ORDER — ASPIRIN 325 MG/1
TABLET ORAL
Status: ACTIVE | COMMUNITY
Start: 2017-12-21

## 2025-04-15 NOTE — HPI-TODAY'S VISIT:
70-year-old male with history of esophageal ring here for recurrent dysphagia.  He has improved with dilations in the past, symptoms recurred a month or 2 ago.  He does have heartburn symptoms about once a week, usually responds well to Pepcid.

## 2025-05-05 ENCOUNTER — CLAIMS CREATED FROM THE CLAIM WINDOW (OUTPATIENT)
Dept: URBAN - METROPOLITAN AREA SURGERY CENTER 7 | Facility: SURGERY CENTER | Age: 70
End: 2025-05-05
Payer: MEDICARE

## 2025-05-05 ENCOUNTER — CLAIMS CREATED FROM THE CLAIM WINDOW (OUTPATIENT)
Dept: URBAN - METROPOLITAN AREA CLINIC 4 | Facility: CLINIC | Age: 70
End: 2025-05-05
Payer: MEDICARE

## 2025-05-05 ENCOUNTER — TELEPHONE ENCOUNTER (OUTPATIENT)
Dept: URBAN - METROPOLITAN AREA CLINIC 27 | Facility: CLINIC | Age: 70
End: 2025-05-05

## 2025-05-05 DIAGNOSIS — K22.89 OTHER SPECIFIED DISEASE OF ESOPHAGUS: ICD-10-CM

## 2025-05-05 DIAGNOSIS — R13.19 OTHER DYSPHAGIA: ICD-10-CM

## 2025-05-05 DIAGNOSIS — K29.70 GASTRITIS, UNSPECIFIED, WITHOUT BLEEDING: ICD-10-CM

## 2025-05-05 DIAGNOSIS — K22.2 ESOPHAGEAL OBSTRUCTION: ICD-10-CM

## 2025-05-05 DIAGNOSIS — K44.9 HIATAL HERNIA: ICD-10-CM

## 2025-05-05 DIAGNOSIS — K31.89 OTHER DISEASES OF STOMACH AND DUODENUM: ICD-10-CM

## 2025-05-05 DIAGNOSIS — K29.70 GASTRITIS WITHOUT BLEEDING, UNSPECIFIED CHRONICITY, UNSPECIFIED GASTRITIS TYPE: ICD-10-CM

## 2025-05-05 PROCEDURE — 43239 EGD BIOPSY SINGLE/MULTIPLE: CPT | Performed by: INTERNAL MEDICINE

## 2025-05-05 PROCEDURE — 43450 DILATE ESOPHAGUS 1/MULT PASS: CPT | Performed by: INTERNAL MEDICINE

## 2025-05-05 PROCEDURE — 88342 IMHCHEM/IMCYTCHM 1ST ANTB: CPT | Performed by: PATHOLOGY

## 2025-05-05 PROCEDURE — 88305 TISSUE EXAM BY PATHOLOGIST: CPT | Performed by: PATHOLOGY

## 2025-05-05 PROCEDURE — 88312 SPECIAL STAINS GROUP 1: CPT | Performed by: PATHOLOGY

## 2025-05-05 PROCEDURE — 00731 ANES UPR GI NDSC PX NOS: CPT | Performed by: REGISTERED NURSE

## 2025-05-05 PROCEDURE — 43239 EGD BIOPSY SINGLE/MULTIPLE: CPT | Performed by: CLINIC/CENTER

## 2025-05-05 PROCEDURE — 43450 DILATE ESOPHAGUS 1/MULT PASS: CPT | Performed by: CLINIC/CENTER

## 2025-05-05 RX ORDER — DICLOFENAC SODIUM 1 %
GEL (GRAM) TOPICAL
Status: ACTIVE | COMMUNITY
Start: 2017-12-13

## 2025-05-05 RX ORDER — ARMODAFINIL 250 MG/1
TABLET ORAL
Status: ACTIVE | COMMUNITY
Start: 2017-12-13

## 2025-05-05 RX ORDER — PANTOPRAZOLE 40 MG/1
TAKE 1 TABLET BY MOUTH DAILY TABLET, DELAYED RELEASE ORAL
Qty: 30 TABLET | Refills: 5 | Status: ACTIVE | COMMUNITY

## 2025-05-05 RX ORDER — LISINOPRIL 5 MG/1
TABLET ORAL
Status: ACTIVE | COMMUNITY
Start: 2017-12-13

## 2025-05-05 RX ORDER — GLIPIZIDE 10 MG/1
TABLET ORAL
Status: ACTIVE | COMMUNITY
Start: 2017-12-13

## 2025-05-05 RX ORDER — PRAVASTATIN SODIUM 40 MG/1
TABLET ORAL
Status: ACTIVE | COMMUNITY
Start: 2017-12-13

## 2025-05-05 RX ORDER — ASPIRIN 325 MG/1
TABLET ORAL
Status: ACTIVE | COMMUNITY
Start: 2017-12-21

## 2025-05-05 RX ORDER — HYDROCODONE BITARTRATE AND ACETAMINOPHEN 10; 325 MG/1; MG/1
1 TABLET AS NEEDED TABLET ORAL
Status: ACTIVE | COMMUNITY

## 2025-05-05 RX ORDER — LORATADINE 5 MG/5ML
SOLUTION ORAL
Status: ACTIVE | COMMUNITY
Start: 2017-12-13

## 2025-05-05 RX ORDER — METFORMIN HYDROCHLORIDE 850 MG/1
TABLET ORAL
Status: ACTIVE | COMMUNITY
Start: 2017-12-13

## 2025-05-05 RX ORDER — TIZANIDINE HYDROCHLORIDE 4 MG/1
CAPSULE ORAL
Status: ACTIVE | COMMUNITY
Start: 2017-12-13

## 2025-05-05 RX ORDER — BUPROPION HYDROCHLORIDE 200 MG/1
TABLET, FILM COATED ORAL
Status: ACTIVE | COMMUNITY
Start: 2017-12-13

## 2025-05-05 RX ORDER — FLUCONAZOLE 200 MG/1
1 TABLET TABLET ORAL DAILY
Qty: 14 TABLET | Refills: 0 | OUTPATIENT
Start: 2025-05-05 | End: 2025-05-19